# Patient Record
(demographics unavailable — no encounter records)

---

## 2018-05-21 NOTE — MMO
BILATERAL SCREENING MAMMOGRAMS:

 

DATE: 5/14/18.

 

HISTORY: 

A 47-year-old female patient presenting for screening mammography.

 

This study is also interpreted with the assistance of computer-aided detection.

 

COMPARISON: 

Studies obtained from [facility] on 7/30/14 and 9/3/15.

 

FINDINGS: 

Scattered fibroglandular densities are seen in each breast.  There are punctate benign-appearing calc
ifications again seen in the right breast.  Intramammary lymph nodes are seen in the upper outer left
 breast similar to prior exam.  No new dominant mass or suspicious grouping of microcalcifications is
 seen in either breast.  

 

IMPRESSION: 

BI-RADS category 2, benign findings.  Routine annual mammographic screening is recommended. 

 

 

BIRADS 2: Benign Finding(s) 

  Routine annual screening mammography (for women over age 40) 

 

POS: MALOU